# Patient Record
Sex: FEMALE | Race: WHITE
[De-identification: names, ages, dates, MRNs, and addresses within clinical notes are randomized per-mention and may not be internally consistent; named-entity substitution may affect disease eponyms.]

---

## 2021-02-13 ENCOUNTER — HOSPITAL ENCOUNTER (EMERGENCY)
Dept: HOSPITAL 72 - EMR | Age: 31
Discharge: LEFT BEFORE DISCHARGE | End: 2021-02-13
Payer: SELF-PAY

## 2021-02-13 DIAGNOSIS — N93.9: Primary | ICD-10-CM

## 2021-02-13 DIAGNOSIS — Z53.21: ICD-10-CM

## 2021-02-13 NOTE — NUR
ED Nurse Note:



Placed second call to pt, no answer and not present in lobby,  states pt 
left and angry for waiting, pt was yelling and shouting at staff through the 
window, pt was informed by me that she was next to be triaged in about 5 
minutes and pt got angry stating she should have gone first, pt also yelling 
she works for [employer] and should be treated now, pt was very angry and 
anxious, asked pt her complaint and it does not warrant to move others, went 
back to triage and pt still not present. will re-attempt before removing from 
tracker.

## 2021-02-13 NOTE — NUR
ED Nurse Note:



Pt continues to not be present in lobby or outside when called for triage, pt 
will be removed from tracker with LWBS, charge nurse aware.

## 2021-02-13 NOTE — NUR
ED Nurse Note:



Pt called in lobby and not present,  states she left stating its taking 
too long, checked outside pt not present, will re-attempt.

## 2021-02-13 NOTE — EMERGENCY ROOM REPORT
History of Present Illness


General


Chief Complaint:  To Be Triaged





Present Illness


HPI


Patient checked in for vaginal bleeding.  Not found in waiting room.  I had no 

interaction with this patient.





Medical Decision Making


Diagnostic Impression:  


   Primary Impression:  


   Patient left without being seen


ER Course


I had no interaction with this patient.  Left prior to triage.


Disposition:  LEFT W/OUT BEING SEEN


Condition:  Unknown











Gary Mccracken MD              Feb 13, 2021 02:16